# Patient Record
Sex: FEMALE | Race: WHITE | NOT HISPANIC OR LATINO | URBAN - METROPOLITAN AREA
[De-identification: names, ages, dates, MRNs, and addresses within clinical notes are randomized per-mention and may not be internally consistent; named-entity substitution may affect disease eponyms.]

---

## 2022-06-22 ENCOUNTER — OFFICE (OUTPATIENT)
Dept: URBAN - METROPOLITAN AREA CLINIC 11 | Facility: CLINIC | Age: 62
End: 2022-06-22

## 2022-06-22 VITALS
HEART RATE: 81 BPM | OXYGEN SATURATION: 92 % | WEIGHT: 226 LBS | SYSTOLIC BLOOD PRESSURE: 140 MMHG | DIASTOLIC BLOOD PRESSURE: 77 MMHG | HEIGHT: 66 IN

## 2022-06-22 DIAGNOSIS — K86.2 CYST OF PANCREAS: ICD-10-CM

## 2022-06-22 DIAGNOSIS — Z86.010 PERSONAL HISTORY OF COLONIC POLYPS: ICD-10-CM

## 2022-06-22 PROCEDURE — 99203 OFFICE O/P NEW LOW 30 MIN: CPT | Performed by: INTERNAL MEDICINE

## 2022-06-22 NOTE — SERVICENOTES
30 minutes were spent today reviewing outside procedure reports and imaging,  interviewing and examining the patient,  discussing a plan of care, and  performing documentation required for today's visit.

## 2022-06-22 NOTE — SERVICEHPINOTES
Ms. Campbell is a 61yo F  That presents as referral from Dr. Hernandez as seen in consultation regarding a pancreatic cyst.  The patient says that she has been in her usual state of health but was told she had a pancreatic cyst.  She says this was initially identified on cross-sectional imaging but she is unsure why she had this imaging.  She says her  is currently dealing with metastatic prostate cancer and because of this she has had a difficult time managing some of her own health issues and therefore is not sure why she initially had the cross-sectional imaging that demonstrated a pancreatic cyst.  She denies any history of pancreatitis or any ongoing abdominal pain.  She did appear to have an MRI, which I have reviewed,  that demonstrated a 10 mm cyst in the pancreatic body.  There were not any associated concerning features such as associated mass, nodule, main pancreatic duct dilation.  The patient says she feels well overall.
br
br   Of note, the patient underwent a colonoscopy in March 2020.  I reviewed these records.  A small sigmoid colon polyp was removed and this returned as an adenoma.  A 5 year recall was recommended.

## 2023-02-10 ENCOUNTER — OUTSIDE FACILITY SERVICE (OUTPATIENT)
Dept: CARDIOLOGY | Facility: CLINIC | Age: 63
End: 2023-02-10
Payer: COMMERCIAL

## 2023-02-10 DIAGNOSIS — R06.02 SHORTNESS OF BREATH: ICD-10-CM

## 2023-02-10 DIAGNOSIS — R07.89 CHEST HEAVINESS: ICD-10-CM

## 2023-02-10 PROCEDURE — 93010 ELECTROCARDIOGRAM REPORT: CPT | Performed by: INTERNAL MEDICINE

## 2023-02-15 ENCOUNTER — TRANSCRIBE ORDERS (OUTPATIENT)
Dept: CARDIOLOGY | Facility: CLINIC | Age: 63
End: 2023-02-15

## 2023-02-15 DIAGNOSIS — R06.02 SHORTNESS OF BREATH: ICD-10-CM

## 2023-02-15 DIAGNOSIS — R07.89 CHEST HEAVINESS: Primary | ICD-10-CM
